# Patient Record
Sex: MALE | Race: WHITE | NOT HISPANIC OR LATINO | Employment: OTHER | URBAN - METROPOLITAN AREA
[De-identification: names, ages, dates, MRNs, and addresses within clinical notes are randomized per-mention and may not be internally consistent; named-entity substitution may affect disease eponyms.]

---

## 2017-05-16 PROBLEM — R00.1 BRADYCARDIA: Status: ACTIVE | Noted: 2017-05-16

## 2017-05-16 PROBLEM — R00.2 PALPITATIONS: Status: ACTIVE | Noted: 2017-05-16

## 2017-05-16 PROBLEM — R00.2 PALPITATION: Status: ACTIVE | Noted: 2017-05-16

## 2017-05-16 PROBLEM — E03.9 HYPOTHYROIDISM: Status: ACTIVE | Noted: 2017-05-16

## 2017-05-16 PROBLEM — Z95.0 STATUS POST PLACEMENT OF CARDIAC PACEMAKER: Status: ACTIVE | Noted: 2017-05-16

## 2017-05-16 PROBLEM — R07.9 CHEST PAIN: Status: ACTIVE | Noted: 2017-05-16

## 2017-05-19 PROBLEM — I49.5 SSS (SICK SINUS SYNDROME): Status: ACTIVE | Noted: 2017-05-19

## 2017-05-30 PROBLEM — K21.9 GERD (GASTROESOPHAGEAL REFLUX DISEASE): Status: ACTIVE | Noted: 2017-05-30

## 2017-05-30 PROBLEM — E03.4 HYPOTHYROIDISM DUE TO ACQUIRED ATROPHY OF THYROID: Status: ACTIVE | Noted: 2017-05-30

## 2017-05-30 PROBLEM — I47.10 SVT (SUPRAVENTRICULAR TACHYCARDIA): Status: ACTIVE | Noted: 2017-05-30

## 2017-05-31 PROBLEM — I95.2 HYPOTENSION DUE TO DRUGS: Status: ACTIVE | Noted: 2017-05-31

## 2017-06-09 PROBLEM — I49.5 TACHY-BRADY SYNDROME: Status: ACTIVE | Noted: 2017-06-09

## 2017-07-25 ENCOUNTER — CLINICAL SUPPORT (OUTPATIENT)
Dept: CARDIOLOGY | Facility: CLINIC | Age: 42
End: 2017-07-25
Payer: MEDICAID

## 2017-07-25 ENCOUNTER — OFFICE VISIT (OUTPATIENT)
Dept: CARDIOLOGY | Facility: CLINIC | Age: 42
End: 2017-07-25
Payer: MEDICAID

## 2017-07-25 VITALS
HEIGHT: 70 IN | WEIGHT: 230 LBS | BODY MASS INDEX: 32.93 KG/M2 | HEART RATE: 75 BPM | DIASTOLIC BLOOD PRESSURE: 86 MMHG | SYSTOLIC BLOOD PRESSURE: 132 MMHG

## 2017-07-25 DIAGNOSIS — G47.33 OSA ON CPAP: ICD-10-CM

## 2017-07-25 DIAGNOSIS — R94.09 ABNORMAL TILT TABLE TEST: ICD-10-CM

## 2017-07-25 DIAGNOSIS — Z95.0 STATUS POST PLACEMENT OF CARDIAC PACEMAKER: ICD-10-CM

## 2017-07-25 DIAGNOSIS — I47.10 SVT (SUPRAVENTRICULAR TACHYCARDIA): ICD-10-CM

## 2017-07-25 DIAGNOSIS — I49.5 SSS (SICK SINUS SYNDROME): ICD-10-CM

## 2017-07-25 DIAGNOSIS — Z95.0 STATUS POST PLACEMENT OF CARDIAC PACEMAKER: Primary | ICD-10-CM

## 2017-07-25 PROCEDURE — 99205 OFFICE O/P NEW HI 60 MIN: CPT | Mod: S$PBB,,, | Performed by: INTERNAL MEDICINE

## 2017-07-25 PROCEDURE — 93280 PM DEVICE PROGR EVAL DUAL: CPT | Mod: PBBFAC,PO | Performed by: INTERNAL MEDICINE

## 2017-07-25 PROCEDURE — 99999 PR PBB SHADOW E&M-NEW PATIENT-LVL III: CPT | Mod: PBBFAC,,, | Performed by: INTERNAL MEDICINE

## 2017-07-25 RX ORDER — LISINOPRIL 5 MG/1
5 TABLET ORAL DAILY
COMMUNITY
End: 2017-07-25

## 2017-07-25 NOTE — PROGRESS NOTES
Subjective:    Patient ID:  Portillo Almeida is a 42 y.o. male who presents for evaluation of No chief complaint on file.      HPI  Pt of Dr Quick/Katharina wanting to establish care for re-occuring PSVT.  Patient was being followed by Dr. Quick, history is interesting.  Apparently was found to be markedly bradycardic with a heart rate of 28-32 with minimal symptoms was referred emergently for pacemaker implantation.  Since pacemaker implantation.  Patient had several episodes of very symptomatic tachycardia and at least one PSVT verified in the emergency department.  Has been hospitalized several episodes with no symptoms in the hospital but then at home without any initiating symptoms or triggers has abrupt onset of palpitation with weakness and near syncope.  Patient was referred to Dr. Posadas, had negative EP study, positive tilt table testing (see report).  Has had outpatient clinic appointment canceled 3 times, thus presents here to establish care.  States symptoms have been improved since on diltiazem still has episodes of palpitations but lasting seconds.  He does give some history  of heat intolerance with near-syncope.  States home systolic blood pressure check range 115 to 135, verified on his I phone.  He denies chest pain or chest pressure very active gentleman apparently had a negative left heart catheterization done at [facility] within the last year.    Review of Systems   Constitution: Negative for decreased appetite and malaise/fatigue.   HENT: Negative for congestion and nosebleeds.    Eyes: Negative for blurred vision.   Cardiovascular: Negative for chest pain, claudication, cyanosis, dyspnea on exertion, irregular heartbeat, leg swelling, near-syncope, orthopnea, palpitations, paroxysmal nocturnal dyspnea and syncope.   Respiratory: Negative for cough and shortness of breath.    Endocrine: Negative for polyuria.   Hematologic/Lymphatic: Does not bruise/bleed easily.    Musculoskeletal: Negative for back pain, falls, joint pain, joint swelling, muscle cramps, muscle weakness and myalgias.   Gastrointestinal: Negative for bloating, abdominal pain, change in bowel habit, nausea and vomiting.   Genitourinary: Negative for urgency.   Neurological: Negative for dizziness, focal weakness and light-headedness.   Psychiatric/Behavioral: Negative for altered mental status.        Objective:    Physical Exam   Constitutional: He is oriented to person, place, and time. He appears well-developed and well-nourished. He is cooperative.   HENT:   Head: Normocephalic and atraumatic.   Eyes: Conjunctivae are normal. Right eye exhibits no exudate. Left eye exhibits no exudate.   Neck: Normal range of motion. Neck supple. Normal carotid pulses and no JVD present. Carotid bruit is not present. No thyromegaly present.   Cardiovascular: Normal rate, regular rhythm, normal heart sounds and intact distal pulses.    Pulses:       Carotid pulses are 2+ on the right side, and 2+ on the left side.       Radial pulses are 2+ on the right side, and 2+ on the left side.        Dorsalis pedis pulses are 2+ on the right side, and 2+ on the left side.        Posterior tibial pulses are 2+ on the right side, and 2+ on the left side.   Pacer site clean and dry, well healed.     Pulmonary/Chest: Effort normal and breath sounds normal.   Abdominal: Soft. Bowel sounds are normal.   Musculoskeletal: Normal range of motion. He exhibits no edema.   Neurological: He is alert and oriented to person, place, and time. Gait normal.   Skin: Skin is warm, dry and intact. No cyanosis. Nails show no clubbing.   Psychiatric: He has a normal mood and affect. His speech is normal and behavior is normal. Judgment and thought content normal.   Nursing note and vitals reviewed.            ..    Chemistry        Component Value Date/Time     07/02/2017 1649    K 3.8 07/02/2017 1649     07/02/2017 1649    CO2 27 07/02/2017  1649    BUN 11 07/02/2017 1649    CREATININE 1.03 07/02/2017 1649    GLU 80 07/02/2017 1649        Component Value Date/Time    CALCIUM 9.1 07/02/2017 1649    ALKPHOS 64 07/02/2017 1649    AST 25 07/02/2017 1649    ALT 35 07/02/2017 1649    BILITOT 0.7 07/02/2017 1649    ESTGFRAFRICA >60 07/02/2017 1649    EGFRNONAA >60 07/02/2017 1649            ..No results found for: CHOL  No results found for: HDL  No results found for: LDLCALC  No results found for: TRIG  No results found for: CHOLHDL  ..  Lab Results   Component Value Date    WBC 7.47 07/02/2017    HGB 13.6 (L) 07/02/2017    HCT 41.4 07/02/2017    MCV 78 (L) 07/02/2017     07/02/2017       Test(s) Reviewed  I have reviewed the following in detail:  [x] Stress test  Neg nuclear stress test 4/17.    [x] Angiography 1/17 [facility]- normal   [x] Echocardiogram   [x] Labs   [x] Other:       Assessment:         ICD-10-CM ICD-9-CM   1. Status post placement of cardiac pacemaker Z95.0 V45.01   2. SVT (supraventricular tachycardia) I47.1 427.89   3. SSS (sick sinus syndrome) I49.5 427.81     Problem List Items Addressed This Visit     SSS (sick sinus syndrome)    Status post placement of cardiac pacemaker - Primary    SVT (supraventricular tachycardia)      Other Visit Diagnoses    None.          Plan:         Return to clinic 1 year   Aerobic exercise 5x's/wk. Heart healthy diet and risk factor modification.    See labs and med orders.  Get angio results  Refer to EP.   Norman lisinopril maximize cardizem  Educated on lower extremity and abdominal strenghting exercises, increase salt/fluid  Intake in order to avoid orthostatic hypotension symptoms.

## 2017-08-07 PROBLEM — R00.0 SINUS TACHYCARDIA: Status: ACTIVE | Noted: 2017-08-07

## 2017-08-14 PROBLEM — Z95.0 PACEMAKER: Status: ACTIVE | Noted: 2017-08-14

## 2017-09-27 PROBLEM — Z95.0 STATUS POST PLACEMENT OF CARDIAC PACEMAKER: Status: RESOLVED | Noted: 2017-05-16 | Resolved: 2017-09-27

## 2017-09-27 PROBLEM — I49.5 SSS (SICK SINUS SYNDROME): Status: RESOLVED | Noted: 2017-05-19 | Resolved: 2017-09-27

## 2017-10-11 ENCOUNTER — TELEPHONE (OUTPATIENT)
Dept: NEUROLOGY | Facility: CLINIC | Age: 42
End: 2017-10-11

## 2017-10-11 NOTE — TELEPHONE ENCOUNTER
----- Message from Devika Montilla sent at 10/10/2017  1:50 PM CDT -----  Contact: [phone]  Patient is requesting a call back from the nurse stated he was referred over to dr morgan, feel off balance. Patient was hospitalized in [place].    Please call the patient upon request at phone number [phone].

## 2017-10-11 NOTE — TELEPHONE ENCOUNTER
Left message informing the pt we do not have any available openings for medicaid pts at this time and recommended he call the [facility] dept.

## 2017-10-12 ENCOUNTER — TELEPHONE (OUTPATIENT)
Dept: NEUROLOGY | Facility: CLINIC | Age: 42
End: 2017-10-12

## 2017-10-12 NOTE — TELEPHONE ENCOUNTER
----- Message from Derrek Ward sent at 10/11/2017  3:08 PM CDT -----  Contact: same  Patient called in and stated he missed a call from Dr. Cortez's office and would like a call back at [phone] work or home at [phone]

## 2017-11-08 ENCOUNTER — TELEPHONE (OUTPATIENT)
Dept: ENDOCRINOLOGY | Facility: CLINIC | Age: 42
End: 2017-11-08

## 2017-11-08 NOTE — TELEPHONE ENCOUNTER
----- Message from Daniela Killian sent at 11/8/2017 10:15 AM CST -----  Contact: gavin Weathers sent referral for pt., do not see in system   Call back to schedule  Thyroid issues   Call back

## 2017-11-30 ENCOUNTER — OFFICE VISIT (OUTPATIENT)
Dept: ENDOCRINOLOGY | Facility: CLINIC | Age: 42
End: 2017-11-30
Payer: MEDICAID

## 2017-11-30 VITALS
HEART RATE: 65 BPM | WEIGHT: 238.13 LBS | BODY MASS INDEX: 33.34 KG/M2 | SYSTOLIC BLOOD PRESSURE: 118 MMHG | DIASTOLIC BLOOD PRESSURE: 82 MMHG | HEIGHT: 71 IN

## 2017-11-30 DIAGNOSIS — E06.3 HYPOTHYROIDISM DUE TO HASHIMOTO'S THYROIDITIS: Primary | ICD-10-CM

## 2017-11-30 DIAGNOSIS — R00.1 BRADYCARDIA: ICD-10-CM

## 2017-11-30 DIAGNOSIS — E78.5 DYSLIPIDEMIA: ICD-10-CM

## 2017-11-30 DIAGNOSIS — R63.5 WEIGHT GAIN: ICD-10-CM

## 2017-11-30 DIAGNOSIS — E03.8 HYPOTHYROIDISM DUE TO HASHIMOTO'S THYROIDITIS: Primary | ICD-10-CM

## 2017-11-30 DIAGNOSIS — R42 DIZZINESS: ICD-10-CM

## 2017-11-30 PROBLEM — E03.4 HYPOTHYROIDISM DUE TO ACQUIRED ATROPHY OF THYROID: Status: RESOLVED | Noted: 2017-05-30 | Resolved: 2017-11-30

## 2017-11-30 PROCEDURE — 99204 OFFICE O/P NEW MOD 45 MIN: CPT | Mod: S$PBB,,, | Performed by: INTERNAL MEDICINE

## 2017-11-30 PROCEDURE — 99213 OFFICE O/P EST LOW 20 MIN: CPT | Mod: PBBFAC,PO | Performed by: INTERNAL MEDICINE

## 2017-11-30 PROCEDURE — 99999 PR PBB SHADOW E&M-EST. PATIENT-LVL III: CPT | Mod: PBBFAC,,, | Performed by: INTERNAL MEDICINE

## 2017-11-30 NOTE — PROGRESS NOTES
Subjective:      Patient ID: Portillo Mancia is a 42 y.o. male.    Chief Complaint:  Hypothyroidism      History of Present Illness    Mr.Stephen Mancia is seen as a new patient for hashimoto    He was admitted twice in last 1 year hospital    Being followed by a cardiologist     Gets episodes of poundig in head     Started 2-3 years ago, worst for past one year     CIRO uses CPAP     Mother with hyperthyroidism s.p GAN ablation     He was taking 125 mcg, was taken off of his medication by his PCP and after 4 weeks his TSH was 40     He is taking synthroid with protonix                  Review of Systems   Constitutional: Positive for fatigue and unexpected weight change (weight gain ).   HENT: Negative for trouble swallowing.    Respiratory: Negative for shortness of breath.    Gastrointestinal: Positive for constipation.   Endocrine: Positive for polydipsia and polyuria.   Skin:        Dry hair and dry skin       Psychiatric/Behavioral: Positive for sleep disturbance (CIRO on CPAP ).       Objective:   Physical Exam   Constitutional: He appears well-developed.   HENT:   Right Ear: External ear normal.   Left Ear: External ear normal.   Nose: Nose normal.   Hearing normal    Neck: No tracheal deviation present. No thyromegaly present.   Cardiovascular: Normal rate.    No murmur heard.  Pulmonary/Chest: Effort normal and breath sounds normal.   Abdominal: Soft. He exhibits no mass.   No hernia noted   Musculoskeletal: He exhibits no edema.   Neurological: He is alert. No cranial nerve deficit or sensory deficit. Coordination and gait normal.        Skin: No rash noted.   No nodules   Psychiatric: He has a normal mood and affect. Judgment normal.   Vitals reviewed.      Lab Review:   10/6/17 .7     US thyroid : outside report   Heterogenous normal gland   No nodules     Results for PORTILLO MANCIA (MRN [id number]) as of 11/30/2017 08:47   Ref. Range 5/16/2017 20:26 5/17/2017 02:09 5/30/2017 21:00 7/2/2017 16:49  9/23/2017 15:14 11/3/2017 09:07   TSH Latest Ref Range: 0.400 - 4.000 uIU/mL 7.010 (H)  5.020 (H) 0.745 40.500 (H) 0.747   Free T4 Latest Ref Range: 0.78 - 2.19 ng/dL  1.14    1.23     Assessment:     1. Hypothyroidism due to Hashimoto's thyroiditis  Cortisol, 8AM    Hemoglobin A1c    Lipid panel    TSH    TSH    Cortisol, 8AM    Hemoglobin A1c    Lipid panel    TSH    TSH   2. Weight gain  Cortisol, 8AM    Hemoglobin A1c    Lipid panel    TSH    TSH    Cortisol, 8AM    Hemoglobin A1c    Lipid panel    TSH    TSH   3. Dyslipidemia  Cortisol, 8AM    Hemoglobin A1c    Lipid panel    TSH    TSH    Cortisol, 8AM    Hemoglobin A1c    Lipid panel    TSH    TSH        # hypothyroidism due to hashimoto     - TSH at goal now  I believe all of his symptoms  can not be explained by hypothyroidism when he is biochecmically euthyroid     - Take it first thing in the morning on an empty stomach with a glass of water. Do not eat or take other meds with it as it is not well absorbed if other substances present in the stomach at the same time.     Specially separate synthroid from protonix for ~ 4 hours     Wait at least 30 minutes before eating /taking other meds /supplements    # dizziness   Check 8AM cortisol and A1c     # dyslipidemia   Advised to talk to his cardiologist  Also offered to recheck in 4 months       Plan:     Follow up:   8AM cortisol and A1c  TSH in 2 months   TSH in 4 months RTC in 4 months   Please help him sign up for [facility]

## 2017-11-30 NOTE — LETTER
December 4, 2017      Citlaly Weathers MD  [address]  [facility] [address]           [facility]  [address] [facility] [address] [place] [address]  Phone: [phone]  Fax: [phone]          Patient: Portillo Almeida   MR Number: [id number]   YOB: 1975   Date of Visit: 11/30/2017       Dear Dr. Citlaly Weathers:    Thank you for referring Portillo Almeida to me for evaluation. Attached you will find relevant portions of my assessment and plan of care.    If you have questions, please do not hesitate to call me. I look forward to following Portillo Almeida along with you.    Sincerely,    Johanna Alonso MD    Enclosure  CC:  No Recipients    If you would like to receive this communication electronically, please contact [email or web address] or [phone] to request more information on [employer] access.    For providers and/or their staff who would like to refer a patient to [facility], please contact us through our one-stop-shop provider referral line, [facility], at [phone].    If you feel you have received this communication in error or would no longer like to receive these types of communications, please e-mail [email or web address]

## 2017-12-04 PROBLEM — R42 DIZZINESS: Status: ACTIVE | Noted: 2017-12-04

## 2017-12-13 ENCOUNTER — TELEPHONE (OUTPATIENT)
Dept: ENDOCRINOLOGY | Facility: CLINIC | Age: 42
End: 2017-12-13

## 2017-12-13 DIAGNOSIS — R73.03 PREDIABETES: Primary | ICD-10-CM

## 2017-12-14 ENCOUNTER — TELEPHONE (OUTPATIENT)
Dept: ENDOCRINOLOGY | Facility: CLINIC | Age: 42
End: 2017-12-14

## 2017-12-14 DIAGNOSIS — R73.9 BLOOD GLUCOSE ELEVATED: Primary | ICD-10-CM

## 2017-12-14 NOTE — TELEPHONE ENCOUNTER
Left message stating:  Dr. Alonso reviewed his outside labs   Cortisol is normal   He has prediabetes   Advise for lifestyle changes with diet and exercise   Repeat A1c in 3 months     Asked pt to call back to inform us of which location he wants his A1C done at so we can help set these up for him

## 2017-12-14 NOTE — TELEPHONE ENCOUNTER
----- Message from RT Babatunde sent at 12/14/2017 11:50 AM CST -----  Contact: pt   Called pod, pt , returned missed call, please call back using number provided, thanks.

## 2017-12-14 NOTE — TELEPHONE ENCOUNTER
Please let MrJesseniaPortillo Almeida know   I reviewed his outside labs   Cortisol is normal   He has prediabetes   Advise for lifestyle changes with diet and exercise   Repeat A1c in 3 months

## 2017-12-14 NOTE — TELEPHONE ENCOUNTER
The patient was informed that his Cortisol level was normal, but he has prediabetes. She would like him to adjust his dietary habits and exercise. He will see Dr Alonso on 4/2/18, and have an A1c drawn at [facility] prior to the appointment.

## 2018-02-22 ENCOUNTER — PATIENT MESSAGE (OUTPATIENT)
Dept: ENDOCRINOLOGY | Facility: CLINIC | Age: 43
End: 2018-02-22

## 2018-02-22 ENCOUNTER — TELEPHONE (OUTPATIENT)
Dept: ENDOCRINOLOGY | Facility: CLINIC | Age: 43
End: 2018-02-22

## 2018-04-02 ENCOUNTER — OFFICE VISIT (OUTPATIENT)
Dept: ENDOCRINOLOGY | Facility: CLINIC | Age: 43
End: 2018-04-02
Payer: MEDICAID

## 2018-04-02 ENCOUNTER — LAB VISIT (OUTPATIENT)
Dept: LAB | Facility: HOSPITAL | Age: 43
End: 2018-04-02
Attending: INTERNAL MEDICINE
Payer: MEDICAID

## 2018-04-02 VITALS
HEART RATE: 50 BPM | DIASTOLIC BLOOD PRESSURE: 70 MMHG | BODY MASS INDEX: 32.13 KG/M2 | SYSTOLIC BLOOD PRESSURE: 100 MMHG | WEIGHT: 224.44 LBS | HEIGHT: 70 IN

## 2018-04-02 DIAGNOSIS — E06.3 HYPOTHYROIDISM DUE TO HASHIMOTO'S THYROIDITIS: Primary | ICD-10-CM

## 2018-04-02 DIAGNOSIS — R63.4 WEIGHT LOSS: ICD-10-CM

## 2018-04-02 DIAGNOSIS — E03.8 HYPOTHYROIDISM DUE TO HASHIMOTO'S THYROIDITIS: Primary | ICD-10-CM

## 2018-04-02 DIAGNOSIS — E03.8 HYPOTHYROIDISM DUE TO HASHIMOTO'S THYROIDITIS: ICD-10-CM

## 2018-04-02 DIAGNOSIS — E06.3 HYPOTHYROIDISM DUE TO HASHIMOTO'S THYROIDITIS: ICD-10-CM

## 2018-04-02 PROCEDURE — 36415 COLL VENOUS BLD VENIPUNCTURE: CPT | Mod: PO

## 2018-04-02 PROCEDURE — 99214 OFFICE O/P EST MOD 30 MIN: CPT | Mod: S$PBB,,, | Performed by: INTERNAL MEDICINE

## 2018-04-02 PROCEDURE — 99213 OFFICE O/P EST LOW 20 MIN: CPT | Mod: PBBFAC,PO | Performed by: INTERNAL MEDICINE

## 2018-04-02 PROCEDURE — 86256 FLUORESCENT ANTIBODY TITER: CPT

## 2018-04-02 PROCEDURE — 99999 PR PBB SHADOW E&M-EST. PATIENT-LVL III: CPT | Mod: PBBFAC,,, | Performed by: INTERNAL MEDICINE

## 2018-04-02 RX ORDER — POLYETHYLENE GLYCOL 3350 17 G/17G
17 POWDER, FOR SOLUTION ORAL
COMMUNITY
Start: 2018-03-13 | End: 2018-07-05

## 2018-04-02 RX ORDER — SUCRALFATE 1 G/1
1 TABLET ORAL
COMMUNITY
Start: 2018-01-29 | End: 2018-07-05

## 2018-04-02 NOTE — PROGRESS NOTES
Subjective:      Patient ID: Portillo Mancia is a 42 y.o. male.    Chief Complaint:  Hypothyroidism and Hashimoto's Thyroiditis      History of Present Illness    Mr.Stephen Mancia comes for f/u today.    He was admitted twice in  2017    Being followed by a cardiologist and a neurologist     Gets episodes of poundig in head     Started 2-3 years ago, worst for past one year     CIRO uses CPAP     Mother with hyperthyroidism s.p GAN ablation     He was taking 125 mcg daily        Since last visit unintentional weight loss                        Review of Systems   Constitutional: Positive for fatigue and unexpected weight change (weight loss).   HENT: Negative for trouble swallowing.    Respiratory: Negative for shortness of breath.    Endocrine: Positive for polydipsia and polyuria.   Musculoskeletal: Negative for gait problem.   Skin:        Dry hair and dry skin       Neurological: Positive for dizziness.   Psychiatric/Behavioral: Positive for sleep disturbance (CIRO on CPAP ).       Objective:   Physical Exam   Constitutional: No distress.   Neck: No thyromegaly present.   Neurological: He is alert. No cranial nerve deficit.   Skin: He is not diaphoretic.   Vitals reviewed.      Lab Review:   10/6/17 .7     US thyroid : outside report   Heterogenous normal gland   No nodules     Results for PORTILLO MANCIA (MRN [id number]) as of 11/30/2017 08:47   Ref. Range 5/16/2017 20:26 5/17/2017 02:09 5/30/2017 21:00 7/2/2017 16:49 9/23/2017 15:14 11/3/2017 09:07   TSH Latest Ref Range: 0.400 - 4.000 uIU/mL 7.010 (H)  5.020 (H) 0.745 40.500 (H) 0.747   Free T4 Latest Ref Range: 0.78 - 2.19 ng/dL  1.14    1.23     Assessment:     1. Hypothyroidism due to Hashimoto's thyroiditis  21-HYDROXYLASE ANTIBODIES, SERUM    ACTH    Cortisol, 8AM    ACTH    Cortisol, 8AM   2. Weight loss  21-HYDROXYLASE ANTIBODIES, SERUM    ACTH    Cortisol, 8AM    ACTH    Cortisol, 8AM        # hypothyroidism due to hashimoto     - TSH at goal  now      # dizziness and weight loss   Last 8AM cortisol was appropriate     Check 8AM cortisol and A1c     # dyslipidemia   Advised to talk to his cardiologist        Plan:     Follow up:  21 adrenal Ab today   Cortisol and ACTH external

## 2018-04-06 LAB — 21HYDROXYLASE AB SER-ACNC: <1 U/ML

## 2018-04-13 PROBLEM — R07.9 CHEST PAIN: Status: RESOLVED | Noted: 2017-05-16 | Resolved: 2018-04-13

## 2018-04-13 PROBLEM — R94.09 ABNORMAL TILT TABLE TEST: Status: RESOLVED | Noted: 2017-07-25 | Resolved: 2018-04-13

## 2018-04-13 PROBLEM — E03.8 HYPOTHYROIDISM DUE TO HASHIMOTO'S THYROIDITIS: Status: RESOLVED | Noted: 2017-11-30 | Resolved: 2018-04-13

## 2018-04-13 PROBLEM — R00.1 BRADYCARDIA: Status: RESOLVED | Noted: 2017-05-16 | Resolved: 2018-04-13

## 2018-04-13 PROBLEM — I47.10 SVT (SUPRAVENTRICULAR TACHYCARDIA): Status: RESOLVED | Noted: 2017-05-30 | Resolved: 2018-04-13

## 2018-04-13 PROBLEM — R00.0 SINUS TACHYCARDIA: Status: RESOLVED | Noted: 2017-08-07 | Resolved: 2018-04-13

## 2018-04-13 PROBLEM — E06.3 HYPOTHYROIDISM DUE TO HASHIMOTO'S THYROIDITIS: Status: RESOLVED | Noted: 2017-11-30 | Resolved: 2018-04-13

## 2018-04-13 PROBLEM — I95.2 HYPOTENSION DUE TO DRUGS: Status: RESOLVED | Noted: 2017-05-31 | Resolved: 2018-04-13

## 2018-06-22 ENCOUNTER — TELEPHONE (OUTPATIENT)
Dept: ENDOCRINOLOGY | Facility: CLINIC | Age: 43
End: 2018-06-22

## 2018-06-22 DIAGNOSIS — E03.9 HYPOTHYROIDISM, UNSPECIFIED TYPE: Primary | ICD-10-CM

## 2018-06-22 NOTE — TELEPHONE ENCOUNTER
----- Message from Sandhya Del Valle sent at 6/22/2018 10:31 AM CDT -----  Please call patient in regards to high thyroid levels.  Please call [phone].

## 2018-06-22 NOTE — TELEPHONE ENCOUNTER
----- Message from Yazmin Duque sent at 6/22/2018 12:10 PM CDT -----  Contact: self   Placed call to pod, patient miss call from your office please call back at [phone] (home) [phone] (work)

## 2018-06-22 NOTE — TELEPHONE ENCOUNTER
Rec'd results from [facility] done on 6/7/18, TSH 5.59.  Also, Cortisol was 10.5 and ACTH was 31 on 4/6/18.  Dr. Alonso's pt.  Please advise.

## 2018-06-22 NOTE — TELEPHONE ENCOUNTER
Left message for pt regarding reported high thyroid levels but non seen in system. Asked pt to fax in lab results if he had them done externally. Pt used to see Dr. Alonso, was advised to call back about results and preferences moving forward

## 2018-06-22 NOTE — TELEPHONE ENCOUNTER
Pt reports hx of hashimoto's and states lab levels are elevated with TSH >5  Called [facility] for results (they will be faxing)  Will call back in July for an appt but in the meantime would like some advice and guidance  Pt used to see Dr. Alonso

## 2018-06-27 RX ORDER — LEVOTHYROXINE SODIUM 137 UG/1
137 TABLET ORAL
Qty: 30 TABLET | Refills: 3 | Status: SHIPPED | OUTPATIENT
Start: 2018-06-27 | End: 2018-07-11

## 2018-06-27 NOTE — TELEPHONE ENCOUNTER
Left message for pt per Dr. Roe  Reviewed labs from [facility].   Will increase Synthroid to 137 mcg.   Check TSH in 6 weeks (appt made and letter mailed)  Let him know cortisol is normal         Instructed to call back with questions or concerns

## 2018-06-27 NOTE — TELEPHONE ENCOUNTER
Reviewed labs from [facility].   Will increase Synthroid to 137 mcg.   Check TSH in 6 weeks  Let him know cortisol is normal

## 2018-08-03 ENCOUNTER — TELEPHONE (OUTPATIENT)
Dept: ENDOCRINOLOGY | Facility: CLINIC | Age: 43
End: 2018-08-03

## 2018-08-03 NOTE — TELEPHONE ENCOUNTER
----- Message from Oliverio Wadsworth sent at 8/3/2018  9:50 AM CDT -----  Type: Needs Medical Advice    Who Called:  Patient    Best Call Back Number: [phone]  Additional Information:  Patient calling.  He's requesting that his non fasting lab on 8/9 at 10:00 am be moved to a facility closer to his home at [facility] in [place]  Please call to advise

## 2018-08-03 NOTE — TELEPHONE ENCOUNTER
Pt frustrated as soonest appt is in Feb. 2019 - states we were supposed to call him for an appt - informed him we have no mechanism for this set up outside of waiting list (unless pt was advised to call back for an appt).  Appt scheduled for 2/21/19 (appt letter mailed) and pt put on waiting list  Lab TSH will be faxed to [facility] in [place] for pt to do soon at his convenience to monitor levels and ensure he is on appropriate dose on synthroid  Will be faxed Monday AM as Dr. Roe needs to sign and is out of clinic today  Pt verbalized understanding

## 2018-08-06 ENCOUNTER — TELEPHONE (OUTPATIENT)
Dept: ENDOCRINOLOGY | Facility: CLINIC | Age: 43
End: 2018-08-06

## 2018-08-13 ENCOUNTER — TELEPHONE (OUTPATIENT)
Dept: ENDOCRINOLOGY | Facility: CLINIC | Age: 43
End: 2018-08-13

## 2019-04-19 PROBLEM — I20.0 UNSTABLE ANGINA: Status: ACTIVE | Noted: 2019-04-19

## 2019-04-19 PROBLEM — E87.6 HYPOKALEMIA: Status: ACTIVE | Noted: 2019-04-19

## 2019-04-19 PROBLEM — E03.9 HYPOTHYROIDISM: Status: ACTIVE | Noted: 2019-04-19

## 2019-04-20 PROBLEM — R07.2 PRECORDIAL PAIN: Status: ACTIVE | Noted: 2019-04-20

## 2021-05-12 ENCOUNTER — PATIENT MESSAGE (OUTPATIENT)
Dept: RESEARCH | Facility: HOSPITAL | Age: 46
End: 2021-05-12

## 2021-07-01 ENCOUNTER — PATIENT MESSAGE (OUTPATIENT)
Dept: ADMINISTRATIVE | Facility: OTHER | Age: 46
End: 2021-07-01